# Patient Record
Sex: MALE | Race: WHITE
[De-identification: names, ages, dates, MRNs, and addresses within clinical notes are randomized per-mention and may not be internally consistent; named-entity substitution may affect disease eponyms.]

---

## 2020-08-12 ENCOUNTER — HOSPITAL ENCOUNTER (EMERGENCY)
Dept: HOSPITAL 26 - MED | Age: 60
Discharge: HOME | End: 2020-08-12
Payer: COMMERCIAL

## 2020-08-12 VITALS — DIASTOLIC BLOOD PRESSURE: 61 MMHG | SYSTOLIC BLOOD PRESSURE: 137 MMHG

## 2020-08-12 VITALS — SYSTOLIC BLOOD PRESSURE: 128 MMHG | DIASTOLIC BLOOD PRESSURE: 63 MMHG

## 2020-08-12 VITALS — BODY MASS INDEX: 27.82 KG/M2 | HEIGHT: 65 IN | WEIGHT: 167 LBS
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
## 2020-08-12 DIAGNOSIS — Z88.6: ICD-10-CM
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
## 2020-08-12 DIAGNOSIS — Z98.890: ICD-10-CM
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
## 2020-08-12 DIAGNOSIS — Z96.611: ICD-10-CM

## 2020-08-12 DIAGNOSIS — S61.411A: Primary | ICD-10-CM

## 2020-08-12 DIAGNOSIS — Z90.49: ICD-10-CM
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
## 2020-08-12 NOTE — NUR
61 Y/O MALE C/O PT BIB SELF FOR C/O 8/10 R HAND PAIN S/P LAC. BLEEDING SLIGHT. 
APPROXIMARTED EDGES. 



ALLERGY: TORADOL